# Patient Record
Sex: FEMALE | Race: WHITE | NOT HISPANIC OR LATINO | Employment: FULL TIME | URBAN - METROPOLITAN AREA
[De-identification: names, ages, dates, MRNs, and addresses within clinical notes are randomized per-mention and may not be internally consistent; named-entity substitution may affect disease eponyms.]

---

## 2018-08-16 ENCOUNTER — RECORDS - HEALTHEAST (OUTPATIENT)
Dept: LAB | Facility: CLINIC | Age: 33
End: 2018-08-16

## 2018-08-17 LAB
BACTERIA SPEC CULT: ABNORMAL
BACTERIA SPEC CULT: ABNORMAL

## 2021-05-28 ENCOUNTER — RECORDS - HEALTHEAST (OUTPATIENT)
Dept: ADMINISTRATIVE | Facility: CLINIC | Age: 36
End: 2021-05-28

## 2021-05-29 ENCOUNTER — RECORDS - HEALTHEAST (OUTPATIENT)
Dept: ADMINISTRATIVE | Facility: CLINIC | Age: 36
End: 2021-05-29

## 2023-07-24 ENCOUNTER — HOSPITAL ENCOUNTER (EMERGENCY)
Facility: CLINIC | Age: 38
Discharge: HOME | End: 2023-07-24
Attending: EMERGENCY MEDICINE | Admitting: EMERGENCY MEDICINE
Payer: COMMERCIAL

## 2023-07-24 VITALS
HEIGHT: 63 IN | RESPIRATION RATE: 16 BRPM | TEMPERATURE: 96.9 F | WEIGHT: 180 LBS | HEART RATE: 90 BPM | SYSTOLIC BLOOD PRESSURE: 133 MMHG | BODY MASS INDEX: 31.89 KG/M2 | DIASTOLIC BLOOD PRESSURE: 83 MMHG | OXYGEN SATURATION: 98 %

## 2023-07-24 DIAGNOSIS — T14.8XXA OPEN WOUND: ICD-10-CM

## 2023-07-24 PROCEDURE — 250N000011 HC RX IP 250 OP 636: Performed by: EMERGENCY MEDICINE

## 2023-07-24 PROCEDURE — 90715 TDAP VACCINE 7 YRS/> IM: CPT | Performed by: EMERGENCY MEDICINE

## 2023-07-24 PROCEDURE — 99283 EMERGENCY DEPT VISIT LOW MDM: CPT | Mod: 25

## 2023-07-24 PROCEDURE — 90471 IMMUNIZATION ADMIN: CPT | Performed by: EMERGENCY MEDICINE

## 2023-07-24 RX ORDER — BACITRACIN ZINC 500 [USP'U]/G
OINTMENT TOPICAL 2 TIMES DAILY
Qty: 15 G | Refills: 0 | Status: SHIPPED | OUTPATIENT
Start: 2023-07-24

## 2023-07-24 RX ADMIN — CLOSTRIDIUM TETANI TOXOID ANTIGEN (FORMALDEHYDE INACTIVATED), CORYNEBACTERIUM DIPHTHERIAE TOXOID ANTIGEN (FORMALDEHYDE INACTIVATED), BORDETELLA PERTUSSIS TOXOID ANTIGEN (GLUTARALDEHYDE INACTIVATED), BORDETELLA PERTUSSIS FILAMENTOUS HEMAGGLUTININ ANTIGEN (FORMALDEHYDE INACTIVATED), BORDETELLA PERTUSSIS PERTACTIN ANTIGEN, AND BORDETELLA PERTUSSIS FIMBRIAE 2/3 ANTIGEN 0.5 ML: 5; 2; 2.5; 5; 3; 5 INJECTION, SUSPENSION INTRAMUSCULAR at 08:23

## 2023-07-24 ASSESSMENT — ACTIVITIES OF DAILY LIVING (ADL): ADLS_ACUITY_SCORE: 33

## 2023-07-24 NOTE — ED TRIAGE NOTES
Pt cut herself on a metal crow at work on 7/12. The wound has been getting better but she thinks that she has tetanus. She report having a stiff jaw this morning. She also reports being very stressed out. Has had a a Tdap shot within the last 6 or 7 years.     Triage Assessment       Row Name 07/24/23 0754       Triage Assessment (Adult)    Airway WDL WDL       Respiratory WDL    Respiratory WDL WDL       Skin Circulation/Temperature WDL    Skin Circulation/Temperature WDL WDL       Cardiac WDL    Cardiac WDL X;rhythm    Pulse Rate & Regularity tachycardic    Cardiac Rhythm ST       Peripheral/Neurovascular WDL    Peripheral Neurovascular WDL WDL       Cognitive/Neuro/Behavioral WDL    Cognitive/Neuro/Behavioral WDL WDL

## 2023-07-24 NOTE — ED PROVIDER NOTES
EMERGENCY DEPARTMENT ENCOUNTER      NAME: Hollie Stokes  AGE: 38 year old female  YOB: 1985  MRN: [id number]  EVALUATION DATE & TIME: 7/24/2023  7:57 AM    PCP: No primary care provider on file.    ED PROVIDER: Mariya Orosco MD      Chief Complaint   Patient presents with     Wound Check     Jaw Pain         FINAL IMPRESSION:  1. Open wound          ED COURSE & MEDICAL DECISION MAKING:    Pertinent Labs & Imaging studies reviewed. (See chart for details)  38 year old female presents to the Emergency Department for evaluation of a warm sensation in her upper mid-cheek bilaterally. She reports an injury to her right lower extremity 12 days ago that has been healing.  She reports that her right leg had ran into a metal pole that was sticking out of the ground.  She has been dressing the wound and it appears to be getting better.  However, given the sensation this morning in her cheeks, she presents to the emergency department for further evaluation.  She states that she often has a stiff jaw as she holds her stress in her jaw, she does not feel like her jaw is particularly more stiff than baseline.  She otherwise denies other contractions, muscle spasms, stiffness.  No difficulty breathing.  No fever, chills, sweats.  Patient believes her last tetanus immunization was 6 or 7 years ago, but is not entirely clear.  I reviewed her chart and was unable to find any immunization record.  I recommend that she try to obtain this from her primary care clinic, regardless we did update her tetanus immunization today.  Patient reports that it was a metal pole, it was not significantly dirty.  She did not need to clean it out significantly.  The wound is well-healing, there is no associated infection at this time.  The patient's main concern is for tetany.  I discussed with her that my suspicion for this is very low.  Patient is not having any muscle spasms, jaw stiffness, contractions.  I discussed with her  signs and symptoms to watch for but again my suspicion is very low.  She was also concerned as there is a sensation in her gumline, this appears to be an aphthous ulcer.  I recommend supportive management for this.    8:07 AM Met with patient for initial interview and exam. Discussed initial plan for care for their stay in the emergency department.  8:44 AM I updated the patient.     At the conclusion of the encounter I discussed the results of all of the tests and the disposition. The questions were answered. The patient or family acknowledged understanding and was agreeable with the care plan.     Medical Decision Making    History:    Supplemental history from: Documented in chart, if applicable and Family Member/Significant Other    External Record(s) reviewed: Documented in chart, if applicable.    Work Up:    Chart documentation includes differential considered and any EKGs or imaging independently interpreted by provider, where specified.    In additional to work up documented, I considered the following work up: Documented in chart, if applicable.    External consultation:    Discussion of management with another provider: Documented in chart, if applicable    Complicating factors:    Care impacted by chronic illness: N/A    Care affected by social determinants of health: N/A    Disposition considerations: Discharge. I prescribed additional prescription strength medication(s) as charted. See documentation for any additional details.      MEDICATIONS GIVEN IN THE EMERGENCY:  Medications   Tdap (tetanus-diphtheria-acell pertussis) (ADACEL) injection 0.5 mL (0.5 mLs Intramuscular $Given 7/24/23 5629)       NEW PRESCRIPTIONS STARTED AT TODAY'S ER VISIT  New Prescriptions    BACITRACIN 500 UNIT/GM EXTERNAL OINTMENT    Apply topically 2 times daily          =================================================================    HPI    Patient information was obtained from: patient    Use of : N/A        "  Hollie Stokes is a 38 year old female with no pertinent history who presents to this ED via walk in for evaluation of wound check and jaw pain    This morning the patient endorsed the sudden onset of jaw stiffness and tingling. The patient also notes a warm sensation near her jaw and states that her symptoms effect her ability to use her jaw. However, she states that she has chronic jaw stiffness and states that her ability to use her jaw is not worse than usual. Additionally, the patient states that she believes that there is a canker sore in her mouth that developed yesterday morning.     On July 12th the patient was at work when she cut her right leg with a metal crow that was on the ground. The patient reports that her last tetanus shot was 6-7 years ago but is unsure. The patient is concerned that her jaw stiffness may be related to this and was prompted to the ED.     The patient denies any numbness, muscle spasms, shortness of breath, fevers, chills, sweats, or any other complaints.      PAST MEDICAL HISTORY:  History reviewed. No pertinent past medical history.    PAST SURGICAL HISTORY:  History reviewed. No pertinent surgical history.        CURRENT MEDICATIONS:    bacitracin 500 UNIT/GM external ointment        ALLERGIES:  Allergies   Allergen Reactions     Cefazolin      Penicillins        FAMILY HISTORY:  History reviewed. No pertinent family history.    SOCIAL HISTORY:   Social History     Socioeconomic History     Marital status: Single     Spouse name: None     Number of children: None     Years of education: None     Highest education level: None       VITALS:  BP (!) 155/95   Pulse 104   Temp 96.9  F (36.1  C) (Temporal)   Resp 16   Ht 1.6 m (5' 3\")   Wt 81.6 kg (180 lb)   SpO2 99%   BMI 31.89 kg/m      PHYSICAL EXAM    Constitutional: Well developed, Well nourished, NAD  HENT: Normocephalic, Atraumatic, Bilateral external ears normal, Oropharynx normal, mucous membranes moist, Nose " normal. Small aphthous ulcer at gumline and tooth 12. Normal range of motion of her jaw.   Neck- Normal range of motion, No tenderness, Supple, No stridor.  Eyes: PERRL, EOMI, Conjunctiva normal, No discharge.   Respiratory: Normal breath sounds, No respiratory distress  Cardiovascular: Normal heart rate, Regular rhythm  Musculoskeletal: No edema. Good range of motion in all major joints. No tenderness to palpation or major deformities noted.   Integument: Warm, Dry, No erythema, No rash. 8 mm open wound on her right anterior mid tibia with some surrounding erythema no warmth or discharge  Neurologic: Alert & oriented x 3, Normal motor function, Normal sensory function, No focal deficits noted. Normal gait. No contractions or muscle spasms.   Psychiatric: Affect normal, Judgment normal, Mood normal.      LAB:  All pertinent labs reviewed and interpreted.       RADIOLOGY:  Reviewed all pertinent imaging. Please see official radiology report.  No orders to display       PROCEDURES:   None      I, Agnieszka Velasquez, am serving as a scribe to document services personally performed by Mariya Orosco, based on my observation and the provider's statements to me. I, Mariya Orosco MD, attest that Agnieszka Velasquez is acting in a scribe capacity, has observed my performance of the services and has documented them in accordance with my direction.    Mariya Orosco MD  Emergency Medicine  [facility] EMERGENCY ROOM  [address] [facility] [id number] [phone]       Mariya Orosco MD  07/24/23 0999